# Patient Record
Sex: MALE | Race: WHITE | NOT HISPANIC OR LATINO | Employment: FULL TIME | URBAN - METROPOLITAN AREA
[De-identification: names, ages, dates, MRNs, and addresses within clinical notes are randomized per-mention and may not be internally consistent; named-entity substitution may affect disease eponyms.]

---

## 2017-08-16 PROCEDURE — 86696 HERPES SIMPLEX TYPE 2 TEST: CPT | Performed by: NURSE PRACTITIONER

## 2017-08-16 PROCEDURE — G0432 EIA HIV-1/HIV-2 SCREEN: HCPCS | Performed by: NURSE PRACTITIONER

## 2017-08-16 PROCEDURE — 87661 TRICHOMONAS VAGINALIS AMPLIF: CPT | Performed by: NURSE PRACTITIONER

## 2017-08-16 PROCEDURE — 87491 CHLMYD TRACH DNA AMP PROBE: CPT | Performed by: NURSE PRACTITIONER

## 2017-08-16 PROCEDURE — 86695 HERPES SIMPLEX TYPE 1 TEST: CPT | Performed by: NURSE PRACTITIONER

## 2017-08-16 PROCEDURE — 80074 ACUTE HEPATITIS PANEL: CPT | Performed by: NURSE PRACTITIONER

## 2017-08-16 PROCEDURE — 87591 N.GONORRHOEAE DNA AMP PROB: CPT | Performed by: NURSE PRACTITIONER

## 2017-08-19 ENCOUNTER — TELEPHONE (OUTPATIENT)
Dept: URGENT CARE | Facility: CLINIC | Age: 22
End: 2017-08-19

## 2020-12-30 ENCOUNTER — HOSPITAL ENCOUNTER (EMERGENCY)
Facility: HOSPITAL | Age: 25
Discharge: HOME | End: 2020-12-31
Attending: EMERGENCY MEDICINE | Admitting: EMERGENCY MEDICINE

## 2020-12-30 ENCOUNTER — APPOINTMENT (OUTPATIENT)
Dept: GENERAL RADIOLOGY | Facility: HOSPITAL | Age: 25
End: 2020-12-30

## 2020-12-30 ENCOUNTER — APPOINTMENT (OUTPATIENT)
Dept: CT IMAGING | Facility: HOSPITAL | Age: 25
End: 2020-12-30

## 2020-12-30 DIAGNOSIS — R06.00 DYSPNEA, UNSPECIFIED TYPE: ICD-10-CM

## 2020-12-30 DIAGNOSIS — R07.9 CHEST PAIN, UNSPECIFIED TYPE: Primary | ICD-10-CM

## 2020-12-30 DIAGNOSIS — B34.9 ACUTE VIRAL SYNDROME: ICD-10-CM

## 2020-12-30 LAB
ALBUMIN SERPL-MCNC: 4.6 G/DL (ref 3.5–5.2)
ALBUMIN/GLOB SERPL: 1.8 G/DL
ALP SERPL-CCNC: 81 U/L (ref 39–117)
ALT SERPL W P-5'-P-CCNC: 44 U/L (ref 1–41)
ANION GAP SERPL CALCULATED.3IONS-SCNC: 16 MMOL/L (ref 5–15)
AST SERPL-CCNC: 60 U/L (ref 1–40)
BASOPHILS # BLD AUTO: 0.04 10*3/MM3 (ref 0–0.2)
BASOPHILS NFR BLD AUTO: 0.5 % (ref 0–1.5)
BILIRUB SERPL-MCNC: 1 MG/DL (ref 0–1.2)
BUN SERPL-MCNC: 9 MG/DL (ref 6–20)
BUN/CREAT SERPL: 12 (ref 7–25)
CALCIUM SPEC-SCNC: 9.7 MG/DL (ref 8.6–10.5)
CHLORIDE SERPL-SCNC: 99 MMOL/L (ref 98–107)
CO2 SERPL-SCNC: 22 MMOL/L (ref 22–29)
CREAT SERPL-MCNC: 0.75 MG/DL (ref 0.76–1.27)
DEPRECATED RDW RBC AUTO: 39.4 FL (ref 37–54)
EOSINOPHIL # BLD AUTO: 0.05 10*3/MM3 (ref 0–0.4)
EOSINOPHIL NFR BLD AUTO: 0.6 % (ref 0.3–6.2)
ERYTHROCYTE [DISTWIDTH] IN BLOOD BY AUTOMATED COUNT: 11.9 % (ref 12.3–15.4)
GFR SERPL CREATININE-BSD FRML MDRD: 127 ML/MIN/1.73
GLOBULIN UR ELPH-MCNC: 2.6 GM/DL
GLUCOSE SERPL-MCNC: 117 MG/DL (ref 65–99)
HCT VFR BLD AUTO: 47.7 % (ref 37.5–51)
HGB BLD-MCNC: 16.5 G/DL (ref 13–17.7)
HOLD SPECIMEN: NORMAL
HOLD SPECIMEN: NORMAL
IMM GRANULOCYTES # BLD AUTO: 0.02 10*3/MM3 (ref 0–0.05)
IMM GRANULOCYTES NFR BLD AUTO: 0.3 % (ref 0–0.5)
LYMPHOCYTES # BLD AUTO: 2.26 10*3/MM3 (ref 0.7–3.1)
LYMPHOCYTES NFR BLD AUTO: 28.6 % (ref 19.6–45.3)
MCH RBC QN AUTO: 31.1 PG (ref 26.6–33)
MCHC RBC AUTO-ENTMCNC: 34.6 G/DL (ref 31.5–35.7)
MCV RBC AUTO: 90 FL (ref 79–97)
MONOCYTES # BLD AUTO: 0.75 10*3/MM3 (ref 0.1–0.9)
MONOCYTES NFR BLD AUTO: 9.5 % (ref 5–12)
NEUTROPHILS NFR BLD AUTO: 4.78 10*3/MM3 (ref 1.7–7)
NEUTROPHILS NFR BLD AUTO: 60.5 % (ref 42.7–76)
NRBC BLD AUTO-RTO: 0 /100 WBC (ref 0–0.2)
PLATELET # BLD AUTO: 212 10*3/MM3 (ref 140–450)
PMV BLD AUTO: 11.8 FL (ref 6–12)
POTASSIUM SERPL-SCNC: 3.4 MMOL/L (ref 3.5–5.2)
PROT SERPL-MCNC: 7.2 G/DL (ref 6–8.5)
RBC # BLD AUTO: 5.3 10*6/MM3 (ref 4.14–5.8)
SODIUM SERPL-SCNC: 137 MMOL/L (ref 136–145)
TROPONIN T SERPL-MCNC: <0.01 NG/ML (ref 0–0.03)
WBC # BLD AUTO: 7.9 10*3/MM3 (ref 3.4–10.8)
WHOLE BLOOD HOLD SPECIMEN: NORMAL
WHOLE BLOOD HOLD SPECIMEN: NORMAL

## 2020-12-30 PROCEDURE — 0 IOPAMIDOL PER 1 ML: Performed by: EMERGENCY MEDICINE

## 2020-12-30 PROCEDURE — 84484 ASSAY OF TROPONIN QUANT: CPT

## 2020-12-30 PROCEDURE — 99283 EMERGENCY DEPT VISIT LOW MDM: CPT

## 2020-12-30 PROCEDURE — 93005 ELECTROCARDIOGRAM TRACING: CPT

## 2020-12-30 PROCEDURE — 93005 ELECTROCARDIOGRAM TRACING: CPT | Performed by: EMERGENCY MEDICINE

## 2020-12-30 PROCEDURE — 85025 COMPLETE CBC W/AUTO DIFF WBC: CPT

## 2020-12-30 PROCEDURE — 71045 X-RAY EXAM CHEST 1 VIEW: CPT

## 2020-12-30 PROCEDURE — 80053 COMPREHEN METABOLIC PANEL: CPT

## 2020-12-30 PROCEDURE — 71275 CT ANGIOGRAPHY CHEST: CPT

## 2020-12-30 RX ORDER — SODIUM CHLORIDE 0.9 % (FLUSH) 0.9 %
10 SYRINGE (ML) INJECTION AS NEEDED
Status: DISCONTINUED | OUTPATIENT
Start: 2020-12-30 | End: 2020-12-31 | Stop reason: HOSPADM

## 2020-12-30 RX ADMIN — IOPAMIDOL 70 ML: 755 INJECTION, SOLUTION INTRAVENOUS at 23:49

## 2020-12-31 VITALS
WEIGHT: 210 LBS | SYSTOLIC BLOOD PRESSURE: 137 MMHG | OXYGEN SATURATION: 94 % | HEIGHT: 76 IN | BODY MASS INDEX: 25.57 KG/M2 | DIASTOLIC BLOOD PRESSURE: 99 MMHG | HEART RATE: 98 BPM | TEMPERATURE: 97.4 F | RESPIRATION RATE: 18 BRPM

## 2020-12-31 LAB
QT INTERVAL: 334 MS
QTC INTERVAL: 439 MS
SARS-COV-2 RNA RESP QL NAA+PROBE: NOT DETECTED
TROPONIN T SERPL-MCNC: <0.01 NG/ML (ref 0–0.03)

## 2020-12-31 PROCEDURE — C9803 HOPD COVID-19 SPEC COLLECT: HCPCS | Performed by: EMERGENCY MEDICINE

## 2020-12-31 PROCEDURE — U0004 COV-19 TEST NON-CDC HGH THRU: HCPCS | Performed by: EMERGENCY MEDICINE

## 2020-12-31 PROCEDURE — 93005 ELECTROCARDIOGRAM TRACING: CPT | Performed by: EMERGENCY MEDICINE

## 2020-12-31 PROCEDURE — 84484 ASSAY OF TROPONIN QUANT: CPT | Performed by: EMERGENCY MEDICINE

## 2021-01-12 NOTE — PROGRESS NOTES
Chief Complaint  Establish Care and Chest Pain    Subjective    History of Present Illness {CC  Problem List  Visit  Diagnosis   Encounters  Notes  Medications  Labs  Result Review Imaging  Media :23}     Pilo Dobson presents to [facility] for chest pain  History of Present Illness   25-year-old male who presents today for evaluation of chest pain at the request of Dr. Palacios.  Patient presented to the ED on 12/31 with acute onset of chest pain shortness of breath.  He reports he was standing up for some dinner when he had onset of shortness of breath, lightheadedness, fatigue and chest pain. He says it felt like someone punched him in the chest.  He denies loss of consciousness but felt near syncopal.  Denies associated palpitations. He also reports associated tingling sensation rating from his left midforearm to his left hand.   CT of the chest negative.  Other work-up in the ED was unremarkable. He has had no further chest pain. He walks daily without any exertional symptoms. Occasional feeling of heart racing about once or twice a week. Denies shortness of breath. He notes a history of heartburn, but only with certain foods. He drinks very little caffeine. Drinks a lot of water. No excessive alcohol intake.   Smokes a couple of cigarettes a week if he is drinking  He reports that he saw a cardiologist when he was a child because of an abnormal EKG and wore a heart monitor. Played sports without any issues. No hx of syncope  Reports history of anxiety but denies increased stress    Cardiac risks: gender, light tobacco use    Objective     Vital Signs:   Vitals:    01/13/21 1349 01/13/21 1350 01/13/21 1351   BP: 139/85 134/85 138/84   BP Location: Right arm Left arm Left arm   Patient Position: Sitting Standing Sitting   Cuff Size: Adult Adult Adult   Pulse: 106 115 106   Resp:   18   Temp:   98.8 °F (37.1 °C)   TempSrc:   Temporal   SpO2: 98% 98% 98%   Weight:    "103 kg (226 lb 8 oz)   Height:   193 cm (76\")     Body mass index is 27.57 kg/m².  Physical Exam  Vitals signs reviewed.   Constitutional:       Appearance: Normal appearance.   HENT:      Head: Normocephalic.   Eyes:      Extraocular Movements: Extraocular movements intact.      Pupils: Pupils are equal, round, and reactive to light.   Neck:      Musculoskeletal: Neck supple.      Vascular: No carotid bruit.   Cardiovascular:      Rate and Rhythm: Regular rhythm. Tachycardia present.      Pulses: Normal pulses.      Heart sounds: Normal heart sounds. No murmur.   Pulmonary:      Effort: Pulmonary effort is normal. No respiratory distress.      Breath sounds: Normal breath sounds.   Chest:      Chest wall: No tenderness.   Abdominal:      General: Abdomen is flat.      Palpations: Abdomen is soft.   Musculoskeletal:      Right lower leg: No edema.      Left lower leg: No edema.   Skin:     General: Skin is warm and dry.   Neurological:      General: No focal deficit present.      Mental Status: He is alert and oriented to person, place, and time. Mental status is at baseline.   Psychiatric:         Mood and Affect: Mood normal.         Behavior: Behavior normal.         Thought Content: Thought content normal.              Result Review  Data Reviewed:{ Labs  Result Review  Imaging  Med Tab  Media :23}   COVID PRE-OP / PRE-PROCEDURE SCREENING ORDER (NO ISOLATION) - Swab, Nasopharynx (12/31/2020 03:03)  Troponin (12/31/2020 01:47)  Troponin (12/30/2020 20:34)  Comprehensive Metabolic Panel (12/30/2020 20:34)  CBC & Differential (12/30/2020 20:34)  XR Chest 1 View (12/30/2020 21:29)  CT Angiogram Chest (12/30/2020 23:54)  ECG 12 Lead (12/31/2020 01:40)    Consultant notes 12/31           Assessment and Plan {CC Problem List  Visit Diagnosis  ROS  Review (Popup)  Health Maintenance  Quality  BestPractice  Medications  SmartSets  SnapShot Encounters  Media :23}   1. Chest pain, atypical  1 episode of " "chest pain in a low risk 25-year-old.  Unlikely ischemic.  He did have some associated near syncopal symptoms and considering his elevated heart rate on today's exam I would be more concerned about arrhythmias therefore will do an extended Holter monitor.  We will also do an echo to rule out any structural heart problems.  - Adult Transthoracic Echo Complete W/ Cont if Necessary Per Protocol; Future  - Lipid Panel; Future  - Thyroid Panel With TSH; Future  - Basic Metabolic Panel; Future    2. Tachycardia  He reports history of anxiety and also that he does not like to come to the doctors.  Could be \"whitecoat\". Will evaluate with a monitor  - Holter Monitor - 72 Hour Up To 21 Days; Future  - Adult Transthoracic Echo Complete W/ Cont if Necessary Per Protocol; Future  - Thyroid Panel With TSH; Future  - Basic Metabolic Panel; Future  - Magnesium; Future    3. Lightheadedness  - Holter Monitor - 72 Hour Up To 21 Days; Future  - Adult Transthoracic Echo Complete W/ Cont if Necessary Per Protocol; Future  - Thyroid Panel With TSH; Future  - Basic Metabolic Panel; Future  - Magnesium; Future    4. Hypokalemia  - Basic Metabolic Panel; Future  - Magnesium; Future    5. 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  Advised him to make sure that he limits his alcohol preferably avoids due to elevated liver enzymes.  I have strongly advised him to get a primary care provider to also follow-up on this.  He defers referral today and says he will make an appointment on his own    6. Elevated blood-pressure reading, without diagnosis of hypertension  ? \"white coat\".  I advised him to start monitoring outside the office.  Discussed goal blood pressure of 120/80.  Discussed risks of uncontrolled blood pressure.  I encouraged him to establish care " with a PCP        Follow Up {Instructions Charge Capture  Follow-up Communications :23}   Return in about 5 weeks (around 2/17/2021) for Telemedicine, Monitor results.    Patient was given instructions and counseling regarding his condition or for health maintenance advice. Please see specific information pulled into the AVS if appropriate.  Patient was instructed to call the [facility] with any questions, concerns, or worsening symptoms.    *Please note that portions of this note were completed with a voice recognition program. Efforts were made to edit the dictations, but occasionally words are mistranscribed.

## 2021-01-13 ENCOUNTER — LAB (OUTPATIENT)
Dept: LAB | Facility: HOSPITAL | Age: 26
End: 2021-01-13

## 2021-01-13 ENCOUNTER — HOSPITAL ENCOUNTER (OUTPATIENT)
Dept: CARDIOLOGY | Facility: HOSPITAL | Age: 26
Discharge: HOME | End: 2021-01-13

## 2021-01-13 ENCOUNTER — OFFICE VISIT (OUTPATIENT)
Dept: CARDIOLOGY | Facility: HOSPITAL | Age: 26
End: 2021-01-13

## 2021-01-13 VITALS
OXYGEN SATURATION: 98 % | RESPIRATION RATE: 18 BRPM | WEIGHT: 226.5 LBS | HEART RATE: 106 BPM | HEIGHT: 76 IN | SYSTOLIC BLOOD PRESSURE: 138 MMHG | TEMPERATURE: 98.8 F | BODY MASS INDEX: 27.58 KG/M2 | DIASTOLIC BLOOD PRESSURE: 84 MMHG

## 2021-01-13 DIAGNOSIS — R07.89 CHEST PAIN, ATYPICAL: ICD-10-CM

## 2021-01-13 DIAGNOSIS — R03.0 ELEVATED BLOOD-PRESSURE READING, WITHOUT DIAGNOSIS OF HYPERTENSION: ICD-10-CM

## 2021-01-13 DIAGNOSIS — R42 LIGHTHEADEDNESS: ICD-10-CM

## 2021-01-13 DIAGNOSIS — R74.8 ELEVATED LIVER ENZYMES: ICD-10-CM

## 2021-01-13 DIAGNOSIS — E87.6 HYPOKALEMIA: ICD-10-CM

## 2021-01-13 DIAGNOSIS — R00.0 TACHYCARDIA: ICD-10-CM

## 2021-01-13 DIAGNOSIS — R07.89 CHEST PAIN, ATYPICAL: Primary | ICD-10-CM

## 2021-01-13 LAB
ANION GAP SERPL CALCULATED.3IONS-SCNC: 12.9 MMOL/L (ref 5–15)
BUN SERPL-MCNC: 7 MG/DL (ref 6–20)
BUN/CREAT SERPL: 9.2 (ref 7–25)
CALCIUM SPEC-SCNC: 9.9 MG/DL (ref 8.6–10.5)
CHLORIDE SERPL-SCNC: 98 MMOL/L (ref 98–107)
CHOLEST SERPL-MCNC: 352 MG/DL (ref 0–200)
CO2 SERPL-SCNC: 26.1 MMOL/L (ref 22–29)
CREAT SERPL-MCNC: 0.76 MG/DL (ref 0.76–1.27)
GFR SERPL CREATININE-BSD FRML MDRD: 125 ML/MIN/1.73
GLUCOSE SERPL-MCNC: 87 MG/DL (ref 65–99)
HDLC SERPL-MCNC: 52 MG/DL (ref 40–60)
LDLC SERPL CALC-MCNC: 195 MG/DL (ref 0–100)
LDLC/HDLC SERPL: 3.84 {RATIO}
MAGNESIUM SERPL-MCNC: 2.1 MG/DL (ref 1.6–2.6)
POTASSIUM SERPL-SCNC: 4.2 MMOL/L (ref 3.5–5.2)
SODIUM SERPL-SCNC: 137 MMOL/L (ref 136–145)
T-UPTAKE NFR SERPL: 0.97 TBI (ref 0.8–1.3)
T4 SERPL-MCNC: 6.86 MCG/DL (ref 4.5–11.7)
TRIGL SERPL-MCNC: 501 MG/DL (ref 0–150)
TSH SERPL DL<=0.05 MIU/L-ACNC: 0.95 UIU/ML (ref 0.27–4.2)
VLDLC SERPL-MCNC: 105 MG/DL (ref 5–40)

## 2021-01-13 PROCEDURE — 36415 COLL VENOUS BLD VENIPUNCTURE: CPT

## 2021-01-13 PROCEDURE — 83735 ASSAY OF MAGNESIUM: CPT

## 2021-01-13 PROCEDURE — 84443 ASSAY THYROID STIM HORMONE: CPT

## 2021-01-13 PROCEDURE — 80048 BASIC METABOLIC PNL TOTAL CA: CPT

## 2021-01-13 PROCEDURE — 80061 LIPID PANEL: CPT

## 2021-01-13 PROCEDURE — 84436 ASSAY OF TOTAL THYROXINE: CPT

## 2021-01-13 PROCEDURE — 93246 EXT ECG>7D<15D RECORDING: CPT

## 2021-01-13 PROCEDURE — 84479 ASSAY OF THYROID (T3 OR T4): CPT

## 2021-01-13 PROCEDURE — 99204 OFFICE O/P NEW MOD 45 MIN: CPT | Performed by: NURSE PRACTITIONER

## 2021-01-13 RX ORDER — ACETAMINOPHEN 500 MG
500 TABLET ORAL EVERY 6 HOURS PRN
COMMUNITY
End: 2022-05-19

## 2021-01-14 ENCOUNTER — TELEPHONE (OUTPATIENT)
Dept: CARDIOLOGY | Facility: HOSPITAL | Age: 26
End: 2021-01-14

## 2021-01-14 NOTE — TELEPHONE ENCOUNTER
Results for orders placed or performed in visit on 01/13/21   Lipid Panel    Specimen: Blood   Result Value Ref Range    Total Cholesterol 352 (H) 0 - 200 mg/dL    Triglycerides 501 (H) 0 - 150 mg/dL    HDL Cholesterol 52 40 - 60 mg/dL    LDL Cholesterol  195 (H) 0 - 100 mg/dL    VLDL Cholesterol 105 (H) 5 - 40 mg/dL    LDL/HDL Ratio 3.84    Thyroid Panel With TSH    Specimen: Blood   Result Value Ref Range    TSH 0.952 0.270 - 4.200 uIU/mL    T Uptake 0.97 0.80 - 1.30 TBI    T4, Total 6.86 4.50 - 11.70 mcg/dL   Basic Metabolic Panel    Specimen: Blood   Result Value Ref Range    Glucose 87 65 - 99 mg/dL    BUN 7 6 - 20 mg/dL    Creatinine 0.76 0.76 - 1.27 mg/dL    Sodium 137 136 - 145 mmol/L    Potassium 4.2 3.5 - 5.2 mmol/L    Chloride 98 98 - 107 mmol/L    CO2 26.1 22.0 - 29.0 mmol/L    Calcium 9.9 8.6 - 10.5 mg/dL    eGFR Non African Amer 125 >60 mL/min/1.73    BUN/Creatinine Ratio 9.2 7.0 - 25.0    Anion Gap 12.9 5.0 - 15.0 mmol/L   Magnesium    Specimen: Blood   Result Value Ref Range    Magnesium 2.1 1.6 - 2.6 mg/dL     Called patient with lab results. Cholesterol significantly elevated.  Patient was not fasting.  He admits to a rather poor diet.  He eats out at least 4 times a week, usually hamburgers.  He says he eats a lot of sandwiches and eggs.  We have a long discussion over the phone regarding improving his diet.  Recommend Mediterranean diet that focuses mainly on vegetables and lean proteins. Also recommend 4g of fish oil a day. Patient was also not fasting.  Due to elevated liver enzymes, we both have decided to not start him on a statin.  He is going to work hard to improve his diet and we are to repeat his liver enzymes and repeat a fasting lipid panel in 6 weeks.  He verbalized understanding with no further questions or concerns

## 2021-02-04 PROCEDURE — 93248 EXT ECG>7D<15D REV&INTERPJ: CPT | Performed by: INTERNAL MEDICINE

## 2021-02-16 ENCOUNTER — APPOINTMENT (OUTPATIENT)
Dept: CARDIOLOGY | Facility: HOSPITAL | Age: 26
End: 2021-02-16

## 2021-02-16 ENCOUNTER — DOCUMENTATION (OUTPATIENT)
Dept: CARDIOLOGY | Facility: HOSPITAL | Age: 26
End: 2021-02-16

## 2021-02-16 NOTE — PROGRESS NOTES
Attempted to call patient multiple times for to do a telehealth visit today or to reschedule to an office visit. Left 2 messages for him to return call

## 2022-05-21 ENCOUNTER — OFFICE VISIT (OUTPATIENT)
Dept: FAMILY MEDICINE CLINIC | Facility: CLINIC | Age: 27
End: 2022-05-21

## 2022-05-21 VITALS
DIASTOLIC BLOOD PRESSURE: 74 MMHG | HEART RATE: 108 BPM | OXYGEN SATURATION: 98 % | TEMPERATURE: 98.7 F | SYSTOLIC BLOOD PRESSURE: 120 MMHG | BODY MASS INDEX: 25.94 KG/M2 | WEIGHT: 213 LBS | HEIGHT: 76 IN

## 2022-05-21 DIAGNOSIS — F41.1 GAD (GENERALIZED ANXIETY DISORDER): Primary | ICD-10-CM

## 2022-05-21 PROCEDURE — 99213 OFFICE O/P EST LOW 20 MIN: CPT | Performed by: NURSE PRACTITIONER

## 2022-05-21 RX ORDER — ESCITALOPRAM OXALATE 10 MG/1
10 TABLET ORAL DAILY
Qty: 30 TABLET | Refills: 0 | Status: SHIPPED | OUTPATIENT
Start: 2022-05-21 | End: 2022-07-07

## 2022-05-21 NOTE — PROGRESS NOTES
"Chief Complaint  Follow-up (05/19/22) and Abdominal Pain    Subjective          Pilo Dobson presents to [facility]  Pt is here today to establish care and for a follow up from  2 days ago. He was seen there for dizziness, light-headedness, weakness, and right upper quadrant pain. He has a history of high blood pressure and a family history of cardiac issues. He states he had an abnormal EKG as a child, and in 2020 he had an abnormal EKG again. He saw a Cardiologist after that. He states he did not go back to Cardiology because he did not have insurance at the time. He wants to wait on Cardiology referral today so that he can talk to family members about what doctor he wants to see. Pt states some of his abdominal symptoms started one week again. Last Saturday he woke up in a cold sweat. Symptoms got worse on Wednesday. Pt states his diet has changed in the last few months, and his symptoms have gotten worse since. He was following a strict diet due to elevated lipid panel. HR is elevated today, and he states that is his normal rate.    Pt would like to discuss anxiety medication. He states he has always had anxiety, but has never taken medication. He states he has a history of panic attacks, but lately they have gotten worse, and he feels like it is disrupting his daily life.     Abdominal Pain  This is a new problem. The current episode started in the past 7 days. The problem has been resolved. The pain is located in the RUQ. The quality of the pain is dull. The abdominal pain radiates to the back. Associated symptoms include belching, diarrhea and nausea. Pertinent negatives include no constipation, fever or vomiting. Nothing aggravates the pain. The pain is relieved by sitting up and liquids.     Objective   Vital Signs:  /74 (BP Location: Left arm, Patient Position: Sitting, Cuff Size: Large Adult)   Pulse 108   Temp 98.7 °F (37.1 °C)   Ht 193 cm (75.98\")   Wt 96.6 " kg (213 lb)   SpO2 98%   BMI 25.94 kg/m²       Physical Exam  Vitals reviewed.   Constitutional:       Appearance: Normal appearance.   HENT:      Nose: Nose normal.      Mouth/Throat:      Mouth: Mucous membranes are moist.   Eyes:      Conjunctiva/sclera: Conjunctivae normal.   Cardiovascular:      Rate and Rhythm: Regular rhythm. Tachycardia present.      Heart sounds: Normal heart sounds.   Pulmonary:      Effort: Pulmonary effort is normal.      Breath sounds: Normal breath sounds.   Abdominal:      General: Bowel sounds are normal.      Palpations: Abdomen is soft.   Musculoskeletal:         General: Normal range of motion.   Skin:     General: Skin is warm.   Neurological:      Mental Status: He is alert and oriented to person, place, and time.   Psychiatric:         Mood and Affect: Mood normal.         Behavior: Behavior normal.        Result Review :                 Assessment and Plan    Diagnoses and all orders for this visit:    1. MOOSE (generalized anxiety disorder) (Primary)  -     Ambulatory Referral to Behavioral Health  -     escitalopram (Lexapro) 10 MG tablet; Take 1 tablet by mouth Daily for 30 days. Take 1/2 daily for one week. Then up dose to 1 tablet daily.  Dispense: 30 tablet; Refill: 0             Follow Up   Return in about 1 month (around 6/21/2022) for Annual.  Patient was given instructions and counseling regarding his condition or for health maintenance advice. Please see specific information pulled into the AVS if appropriate.

## 2022-07-07 ENCOUNTER — OFFICE VISIT (OUTPATIENT)
Dept: FAMILY MEDICINE CLINIC | Facility: CLINIC | Age: 27
End: 2022-07-07

## 2022-07-07 VITALS
WEIGHT: 211 LBS | TEMPERATURE: 98.2 F | HEIGHT: 75 IN | HEART RATE: 103 BPM | BODY MASS INDEX: 26.24 KG/M2 | OXYGEN SATURATION: 97 % | DIASTOLIC BLOOD PRESSURE: 80 MMHG | SYSTOLIC BLOOD PRESSURE: 122 MMHG

## 2022-07-07 DIAGNOSIS — Z00.00 ANNUAL PHYSICAL EXAM: Primary | ICD-10-CM

## 2022-07-07 DIAGNOSIS — Z72.0 TOBACCO USE: ICD-10-CM

## 2022-07-07 DIAGNOSIS — F41.9 ANXIETY: ICD-10-CM

## 2022-07-07 DIAGNOSIS — R53.83 OTHER FATIGUE: ICD-10-CM

## 2022-07-07 PROCEDURE — 99214 OFFICE O/P EST MOD 30 MIN: CPT | Performed by: NURSE PRACTITIONER

## 2022-07-07 PROCEDURE — 99395 PREV VISIT EST AGE 18-39: CPT | Performed by: NURSE PRACTITIONER

## 2022-07-07 RX ORDER — SERTRALINE HYDROCHLORIDE 25 MG/1
25 TABLET, FILM COATED ORAL DAILY
Qty: 30 TABLET | Refills: 1 | Status: SHIPPED | OUTPATIENT
Start: 2022-07-07 | End: 2022-07-26 | Stop reason: DRUGHIGH

## 2022-07-07 NOTE — PROGRESS NOTES
"Chief Complaint  Annual Exam and Anxiety (Pt states he started Lexapro at his last visit and it has made him kind of jittery and nervous. )    Subjective          Pilo Dobson presents to [facility] for   Pt is here today for his annual exam. He does not have any concerns at this time, other than anxiety symptoms. He states he does have seasonal allergies. He takes loratadine daily to manage. He reports good results with this.     At last visit, pt was started on Lexapro. He states he feels like it has made him jittery. He had increased to 10 Mg dose, but has been taking 5 Mg for last 4 days. Pt was not aware at last visit that he had taken other anxiety/depression meds in the past. After talking with his mother, she sent a long list of medications that they tried with him in his younger life. Most of the medications produced side effects. Pt will gradually stop Lexapro, and start Zoloft. This seems to be one that has worked for him in the past. He will also follow up with behavioral health for med management and counseling services. He does not feel like he can wait for Behavioral Health appt to try another medication due to level of anxiety.    Pt is a current every day smoker. He states he smokes 3-4 cigarettes/day. Discussed smoking cessation. Also discussed how nicotine can caused increased anxiety. Smoking cessation information added to AVS.     Pt is experiencing fatigue. He states he has difficulty getting up in the morning. This may be related to medication, although he is on a small dose.     Objective   Vital Signs:   Vitals:    07/07/22 0826   BP: 122/80   Pulse: 103   Temp: 98.2 °F (36.8 °C)   SpO2: 97%   Weight: 95.7 kg (211 lb)   Height: 190.5 cm (75\")   PainSc: 0-No pain     Body mass index is 26.37 kg/m².    BMI is >= 25 and <30. (Overweight) The following options were offered after discussion;: none (medical contraindication)    Physical Exam  Vitals reviewed. "   Constitutional:       Appearance: Normal appearance.   HENT:      Right Ear: Tympanic membrane, ear canal and external ear normal.      Left Ear: Tympanic membrane, ear canal and external ear normal.      Nose: Nose normal.      Mouth/Throat:      Mouth: Mucous membranes are moist.   Eyes:      Extraocular Movements: Extraocular movements intact.      Conjunctiva/sclera: Conjunctivae normal.      Pupils: Pupils are equal, round, and reactive to light.   Cardiovascular:      Rate and Rhythm: Normal rate and regular rhythm.      Pulses: Normal pulses.      Heart sounds: Normal heart sounds.   Pulmonary:      Effort: Pulmonary effort is normal.      Breath sounds: Normal breath sounds.   Abdominal:      General: Abdomen is flat. Bowel sounds are normal.      Palpations: Abdomen is soft.   Musculoskeletal:         General: Normal range of motion.      Cervical back: Normal range of motion and neck supple.   Skin:     General: Skin is warm.   Neurological:      Mental Status: He is alert and oriented to person, place, and time.   Psychiatric:         Mood and Affect: Mood normal.         Behavior: Behavior normal.         Thought Content: Thought content normal.        Result Review :              Immunization History   Administered Date(s) Administered   • COVID-19 (MODERNA) 1st, 2nd, 3rd Dose Only 08/21/2021, 09/18/2021   • Influenza, Unspecified 10/09/2018   • Tdap 07/31/2015     Health Maintenance   Topic Date Due   • INFLUENZA VACCINE  10/01/2022   • TDAP/TD VACCINES (2 - Td or Tdap) 07/31/2025        Assessment and Plan    Diagnoses and all orders for this visit:    1. Annual physical exam (Primary)    2. Tobacco use    3. Other fatigue  -     Vitamin D 25 Hydroxy; Future  -     CBC Auto Differential; Future    4. Anxiety  -     sertraline (Zoloft) 25 MG tablet; Take 1 tablet by mouth Daily. Take 1/2 tablet by mouth daily for 1 week, then increase to 1 tablet by mouth daily.  Dispense: 30 tablet; Refill: 1  -      Lipid Panel; Future        Counseled on health maintenance topics and preventative care recommendations:     Counseled on immunizations.   Counseled on smoking cessation and risks of smoking.       Follow Up   No follow-ups on file.  Patient was given instructions and counseling regarding his condition or for health maintenance advice. Please see specific information pulled into the AVS if appropriate.

## 2022-07-26 ENCOUNTER — TELEMEDICINE (OUTPATIENT)
Dept: PSYCHIATRY | Facility: CLINIC | Age: 27
End: 2022-07-26

## 2022-07-26 DIAGNOSIS — F41.1 GAD (GENERALIZED ANXIETY DISORDER): Primary | ICD-10-CM

## 2022-07-26 PROCEDURE — 90792 PSYCH DIAG EVAL W/MED SRVCS: CPT | Performed by: NURSE PRACTITIONER

## 2022-07-26 RX ORDER — CLONAZEPAM 0.5 MG/1
0.5 TABLET ORAL 2 TIMES DAILY PRN
Qty: 15 TABLET | Refills: 0 | Status: SHIPPED | OUTPATIENT
Start: 2022-07-26 | End: 2022-08-17 | Stop reason: SDUPTHER

## 2022-07-26 NOTE — PROGRESS NOTES
Patient Name: Pilo Dobson  MRN: [id number]   :  1995     Referring Physician: Sherry Bellamy APRN    This provider is located at the [facility] (through [facility]), [address] [facility] [address], [facility] using a secure MyChart Video Visit through [employer]. Patient is being seen remotely via telehealth at their home address in [place], and stated they are in a secure environment for this session. The patient's condition being diagnosed/treated is appropriate for telemedicine. The provider identified herself as well as her credentials.   The patient, and/or patients guardian, consent to be seen remotely, and when consent is given they understand that the consent allows for patient identifiable information to be sent to a third party as needed.   They may refuse to be seen remotely at any time. The electronic data is encrypted and password protected, and the patient and/or guardian has been advised of the potential risks to privacy not withstanding such measures.    You have chosen to receive care through a telehealth visit.  Do you consent to use a video/audio connection for your medical care today? Yes    Chief Complaint:     ICD-10-CM ICD-9-CM   1. MOOSE (generalized anxiety disorder)  F41.1 300.02       HPI:   Pilo Dobson is a 27 y.o. male who is here today for initial evaluation of Anxiety .,  Patient states he has been struggling with anxiety and depression.  Patient was initially placed on Lexapro which made his anxiety much worse.  Patient was then placed on Zoloft 25 mg daily.  Patient states this helped his depression however only decreased anxiety minimally.  States it still not at a manageable level.  Patient states he has been struggling with this for years.  Patient is in sales at [employer].  Patient is in a long-term relationship.  His girlfriend has a daughter that 6 years old.  Patient states things got pretty intense when he came home on Friday  having a panic attack that kept him in bed all weekend.  Girlfriend felt at that time it was important for patient to get help.  Patient states his mom and brother are both on Zoloft and it seems to work for them.  Patient states his mom is also 1 as needed Klonopin.    Past Medical History:   Past Medical History:   Diagnosis Date   • Anxiety    • Depression    • Headache    • Hypertension    • Urinary tract infection        Past Surgical History:   Past Surgical History:   Procedure Laterality Date   • DENTAL PROCEDURE     • TONSILLECTOMY         Social History:   Social History     Socioeconomic History   • Marital status: Single   Tobacco Use   • Smoking status: Light Tobacco Smoker     Packs/day: 0.25     Years: 0.50     Pack years: 0.12     Types: Cigarettes     Start date: 1/1/2018   • Smokeless tobacco: Never Used   • Tobacco comment: 4-5 CIGARETTES PER DAY   Vaping Use   • Vaping Use: Never used   Substance and Sexual Activity   • Alcohol use: Yes     Alcohol/week: 16.0 standard drinks     Types: 10 Cans of beer, 6 Shots of liquor per week   • Drug use: Not Currently     Types: Marijuana   • Sexual activity: Yes     Partners: Female       Family History:  Family History   Problem Relation Age of Onset   • Mental illness Mother    • Hypertension Mother    • Obesity Mother    • Obesity Father    • Epilepsy Brother    • Heart valve disorder Maternal Grandmother    • Alcohol abuse Maternal Grandfather    • No Known Problems Paternal Grandmother    • Leukemia Paternal Grandfather    • Thyroid disease Half-Sister        Allergy:  No Known Allergies    Current Medications:   Current Outpatient Medications   Medication Sig Dispense Refill   • clonazePAM (KlonoPIN) 0.5 MG tablet Take 1 tablet by mouth 2 (Two) Times a Day As Needed for Anxiety. 15 tablet 0   • sertraline (Zoloft) 50 MG tablet Take 1 tablet by mouth Daily. 30 tablet 2     No current facility-administered medications for this visit.       Lab Results:    No visits with results within 3 Month(s) from this visit.   Latest known visit with results is:   Lab on 01/13/2021   Component Date Value Ref Range Status   • Total Cholesterol 01/13/2021 352 (A) 0 - 200 mg/dL Final   • Triglycerides 01/13/2021 501 (A) 0 - 150 mg/dL Final   • HDL Cholesterol 01/13/2021 52  40 - 60 mg/dL Final   • LDL Cholesterol  01/13/2021 195 (A) 0 - 100 mg/dL Final   • VLDL Cholesterol 01/13/2021 105 (A) 5 - 40 mg/dL Final   • LDL/HDL Ratio 01/13/2021 3.84   Final   • TSH 01/13/2021 0.952  0.270 - 4.200 uIU/mL Final   • T Uptake 01/13/2021 0.97  0.80 - 1.30 TBI Final   • T4, Total 01/13/2021 6.86  4.50 - 11.70 mcg/dL Final    T4 results may be falsely increased if patient taking Biotin.   • Glucose 01/13/2021 87  65 - 99 mg/dL Final   • BUN 01/13/2021 7  6 - 20 mg/dL Final   • Creatinine 01/13/2021 0.76  0.76 - 1.27 mg/dL Final   • Sodium 01/13/2021 137  136 - 145 mmol/L Final   • Potassium 01/13/2021 4.2  3.5 - 5.2 mmol/L Final   • Chloride 01/13/2021 98  98 - 107 mmol/L Final   • CO2 01/13/2021 26.1  22.0 - 29.0 mmol/L Final   • Calcium 01/13/2021 9.9  8.6 - 10.5 mg/dL Final   • eGFR Non African Amer 01/13/2021 125  >60 mL/min/1.73 Final   • BUN/Creatinine Ratio 01/13/2021 9.2  7.0 - 25.0 Final   • Anion Gap 01/13/2021 12.9  5.0 - 15.0 mmol/L Final   • Magnesium 01/13/2021 2.1  1.6 - 2.6 mg/dL Final       Review of Symptoms:   Review of Systems   Constitutional: Negative for activity change, appetite change, fatigue, unexpected weight gain and unexpected weight loss.   Respiratory: Negative for shortness of breath and wheezing.    Gastrointestinal: Negative for constipation, diarrhea, nausea and vomiting.   Musculoskeletal: Negative for gait problem.   Skin: Negative for dry skin and rash.   Neurological: Negative for dizziness, speech difficulty, weakness, light-headedness, headache, memory problem and confusion.   Psychiatric/Behavioral: Positive for stress. Negative for agitation,  behavioral problems, decreased concentration, dysphoric mood, hallucinations, self-injury, sleep disturbance, suicidal ideas, negative for hyperactivity and depressed mood. The patient is nervous/anxious.        Physical Exam:   Physical Exam  Vitals and nursing note reviewed.   Constitutional:       General: He is not in acute distress.     Appearance: He is well-developed. He is not diaphoretic.   HENT:      Head: Normocephalic and atraumatic.   Eyes:      Conjunctiva/sclera: Conjunctivae normal.   Pulmonary:      Effort: Pulmonary effort is normal. No respiratory distress.   Musculoskeletal:         General: Normal range of motion.      Cervical back: Full passive range of motion without pain and normal range of motion.   Neurological:      Mental Status: He is alert and oriented to person, place, and time.   Psychiatric:         Mood and Affect: Mood is anxious. Mood is not depressed. Affect is not labile, blunt, angry or inappropriate.         Speech: Speech is not rapid and pressured or tangential.         Behavior: Behavior normal. Behavior is not agitated, slowed, aggressive, withdrawn, hyperactive or combative. Behavior is cooperative.         Thought Content: Thought content normal. Thought content is not paranoid or delusional. Thought content does not include homicidal or suicidal ideation. Thought content does not include homicidal or suicidal plan.         Judgment: Judgment normal.       There were no vitals taken for this visit.  There is no height or weight on file to calculate BMI. Video appt unable to obtain.     Mental Status Exam:   Appearance: appropriate  Hygiene:   good  Cooperation:  Cooperative  Eye Contact:  Good  Psychomotor Behavior:  Appropriate  Mood:anxious  Affect:  Appropriate  Hopelessness: Denies  Speech:  Normal  Thought Process:  Goal directed  Thought Content:  Normal  Suicidal:  None  Homicidal:  None  Hallucinations:  None  Delusion:  None  Memory:  Intact  Orientation:   "Person, Place, Time and Situation  Reliability:  good  Insight:  Good  Judgement:  Good  Impulse Control:  Good  Physical/Medical Issues:  No     PHQ-9 Depression Screening  Little interest or pleasure in doing things? (P) 2   Feeling down, depressed, or hopeless? (P) 1   Trouble falling or staying asleep, or sleeping too much? (P) 3   Feeling tired or having little energy? (P) 1   Poor appetite or overeating? (P) 1   Feeling bad about yourself - or that you are a failure or have let yourself or your family down? (P) 1   Trouble concentrating on things, such as reading the newspaper or watching television? (P) 2   Moving or speaking so slowly that other people could have noticed? Or the opposite - being so fidgety or restless that you have been moving around a lot more than usual? (P) 1   Thoughts that you would be better off dead, or of hurting yourself in some way? (P) 0   PHQ-9 Total Score (P) 12   If you checked off any problems, how difficult have these problems made it for you to do your work, take care of things at home, or get along with other people? (P) Somewhat difficult      Controlled Substance Medication Contract    Controlled substance medications (i.e. benzodiazepines, opioids, amphetamines) are very useful, but have a high potential for misuse and are, therefore, closely controlled by local, state, and federal government(s). As a patient of [facility], you agree and understand the followin. I am responsible for the controlled substance medications prescribed to me. If my prescription is misplaced, stolen, or if \"I run out early,\" I understand this medication will not be replaced regardless of the circumstances.  2. Refills of controlled substance medications: (a) Will be made only during regular office hours Monday-Thursday once a month and during a scheduled virtual appointment. Refills will not be made at night, weekends, or on holidays. (b) Will not be made if " "\"I lost my prescriptions,\" \"ran out early,\" or \"misplaced my medication\". I am solely responsible for taking the medication as prescribed and for keeping track of the remaining.   3. I agree to comply with urine drug testing and pill counts at the provider's discretion, thereby, documenting the proper use of any medications. If alcohol abuse is suspected, a breathalyzer or blood alcohol level may be ordered. Unannounced urine or serum toxicology specimens may be requested and my cooperation is required.  4. I understand that if I violate any of the above conditions, my prescriptions for controlled medications my be terminated. If the violation involves obtaining these medications from another individual, or the concomitant use of non-prescription illicit (illegal) drugs, I may also be reported to other providers, pharmacies, medical facilities and the appropriate authorities.   5. I further understand that if I violate this controlled substance contract due to non-compliance of medical directions, such as the failure in taking medications as prescribed, utilizing other illicit drugs, or abuse of controlled medications, the prescription for controlled medications may be terminated.   6. I agree to keep my scheduled appointments and conduct myself in a courteous manner.  7. I agree not to sell, share, or give any medication to another person. I understand that such mishandling of my medication is a serious violation of this agreement and would result in my treatment being terminated without any recourse for appeal.   8. I agree not to take my medication from any physicians, nurse practitioners, pharmacies or other sources without telling my prescriber.  9. I agree to take my medication as my prescriber has instructed and not to alter the way I take my medication without first consulting my prescriber.  10. I agree to abstain from problematic alcohol usage, opioids, marijuana, cocaine, and other addictive substances. "   11. If I am legally involved related to legal or illegal drugs, including alcohol, refill of controlled substances will not be given until a re-evaluation of my chemical dependency treatment plan has been completed. Refills are at the discretion of the prescriber.   12. I agree to fill all of my controlled medications at an in-state ([place]) pharmacy.   13. I understand that [facility] utilizes the [place] All Schedule Prescription Electronic Reporting (JIL) system and will monitor my prescription history via this source.  14. Benzodiazepines are drugs prescribed to treat conditions like anxiety, insomnia, and seizures. Examples of these drugs include: alprazolam, clonazepam, diazepam, and lorazepam. The FDA has applied a Black Box Warning (one of the strictest warnings) that the use of opioids and benzodiazepines together have serious risks to include unusual dizziness or lightheadedness, extreme sleepiness, slowed or difficult breathing, coma and death. There is an added risk if alcohol is also ingested. It is the policy of [facility] to NOT prescribe benzodiazepines to patients who also use opioids. If a patient already presents already prescribed both, the prescriber my direct the patient to their previous provider who prescribed it or taper the benzodiazepine as part of the treatment plan. These patients must be monitored at appropriate intervals and so visits may be more frequent.     This APRN has discussed and reviewed this information with the patient and/or guardian. The patient and/or guardian verbally agreed (no signatures are obtained during today's visit as they are a telehealth patient and is unable to print and sign this document, therefore, verbal agreement has been obtained).     Reviewed [facility] # [id number]    Assessment/Plan:   Diagnoses and all orders for this visit:    1. MOOSE (generalized anxiety disorder) (Primary)  -      sertraline (Zoloft) 50 MG tablet; Take 1 tablet by mouth Daily.  Dispense: 30 tablet; Refill: 2  -     clonazePAM (KlonoPIN) 0.5 MG tablet; Take 1 tablet by mouth 2 (Two) Times a Day As Needed for Anxiety.  Dispense: 15 tablet; Refill: 0  -     Compliance Drug Analysis, Ur - Urine, Clean Catch; Future  -     Ambulatory Referral to Psychiatry    Patient has significant anxiety.  Patient states that Zoloft has helped his depression but not much of the anxiety.  We will increase Zoloft to 50 mg daily.  We will give Klonopin 0.5 mg #15 to last the entire month.  Patient will get urine drug screen.  Patient is aware to use Klonopin very sparingly.    A psychological evaluation was conducted in order to assess past and current level of functioning. Areas assessed included, but were not limited to: perception of social support, perception of ability to face and deal with challenges in life (positive functioning), anxiety symptoms, depressive symptoms, perspective on beliefs/belief system, coping skills for stress, intelligence level,  Therapeutic rapport was established. Interventions conducted today were geared towards incorporating medication management along with support for continued therapy. Education was also provided as to the med management with this provider and what to expect in subsequent sessions.    We discussed risks, benefits,goals and side effects of the above medication and the patient was agreeable with the plan.Patient was educated on the importance of compliance with treatment and follow-up appointments. Patient is aware to contact the [facility] with any worsening of symptoms. To call for questions or concerns and return early if necessary. Patent is agreeable to go to the Emergency Department or call 911 should they begin SI/HI.     Part of this note may be an electronic transcription/translation of spoken language to printed text using the Dragon Dictation System.    Return in about 3 weeks  (around 8/16/2022) for Follow Up 30 min, Video visit.    Kenia Mast, APRN

## 2022-07-27 ENCOUNTER — LAB (OUTPATIENT)
Dept: LAB | Facility: HOSPITAL | Age: 27
End: 2022-07-27

## 2022-08-17 ENCOUNTER — TELEMEDICINE (OUTPATIENT)
Dept: PSYCHIATRY | Facility: CLINIC | Age: 27
End: 2022-08-17

## 2022-08-17 DIAGNOSIS — F41.1 GAD (GENERALIZED ANXIETY DISORDER): Primary | ICD-10-CM

## 2022-08-17 PROCEDURE — 99214 OFFICE O/P EST MOD 30 MIN: CPT | Performed by: NURSE PRACTITIONER

## 2022-08-17 RX ORDER — CLONAZEPAM 0.5 MG/1
0.5 TABLET ORAL 2 TIMES DAILY PRN
Qty: 15 TABLET | Refills: 2 | Status: SHIPPED | OUTPATIENT
Start: 2022-08-17 | End: 2022-12-13 | Stop reason: SDUPTHER

## 2022-08-17 NOTE — PROGRESS NOTES
Patient Name: Pilo Dobson  MRN: [id number]   :  1995     This provider is located at [facility]), [address], [address] using a secure SmartCrowdshart Video Visit through [employer]. Patient is being seen remotely via telehealth at their home address in [place], and stated they are in a secure environment for this session. The patient's condition being diagnosed/treated is appropriate for telemedicine. The provider identified herself as well as her credentials.   The patient, and/or patients guardian, consent to be seen remotely, and when consent is given they understand that the consent allows for patient identifiable information to be sent to a third party as needed.   They may refuse to be seen remotely at any time. The electronic data is encrypted and password protected, and the patient and/or guardian has been advised of the potential risks to privacy not withstanding such measures.    You have chosen to receive care through a telehealth visit.  Do you consent to use a video/audio connection for your medical care today? Yes    Chief Complaint:      ICD-10-CM ICD-9-CM   1. MOOSE (generalized anxiety disorder)  F41.1 300.02       History of Present Illness: Pilo Dobson is a 27 y.o. male is here today for medication management follow up.  Patient states he is doing pretty well.  He is still up to 25 mg of Zoloft as this has helped his depression and his anxiety is more stable.  Tried 50 mg but it started to affect his sleep.  Patient states Klonopin is working well when he needs it.  States he typically takes a half of the pill and this works well.  During vacation or traveling he did take half a pill and once at work he took half a pill.  Started talking to her therapist and is enjoying this as well.    The following portions of the patient's history were reviewed and updated as appropriate: allergies, current medications, past  family history, past medical history, past social history, past surgical history and problem list.    Review of Systems;;  Review of Systems   Constitutional: Negative for activity change, appetite change, fatigue, unexpected weight gain and unexpected weight loss.   Respiratory: Negative for shortness of breath and wheezing.    Gastrointestinal: Negative for constipation, diarrhea, nausea and vomiting.   Musculoskeletal: Negative for gait problem.   Skin: Negative for dry skin and rash.   Neurological: Negative for dizziness, speech difficulty, weakness, light-headedness, headache, memory problem and confusion.   Psychiatric/Behavioral: Negative for agitation, behavioral problems, decreased concentration, dysphoric mood, hallucinations, self-injury, sleep disturbance, suicidal ideas, negative for hyperactivity, depressed mood and stress. The patient is not nervous/anxious.        Physical Exam;;  Physical Exam  Vitals and nursing note reviewed.   Constitutional:       General: He is not in acute distress.     Appearance: He is well-developed. He is not diaphoretic.   HENT:      Head: Normocephalic and atraumatic.   Eyes:      Conjunctiva/sclera: Conjunctivae normal.   Pulmonary:      Effort: Pulmonary effort is normal. No respiratory distress.   Musculoskeletal:         General: Normal range of motion.      Cervical back: Full passive range of motion without pain and normal range of motion.   Neurological:      Mental Status: He is alert and oriented to person, place, and time.   Psychiatric:         Mood and Affect: Mood is not anxious or depressed. Affect is not labile, blunt, angry or inappropriate.         Speech: Speech is not rapid and pressured or tangential.         Behavior: Behavior normal. Behavior is not agitated, slowed, aggressive, withdrawn, hyperactive or combative. Behavior is cooperative.         Thought Content: Thought content normal. Thought content is not paranoid or delusional. Thought  content does not include homicidal or suicidal ideation. Thought content does not include homicidal or suicidal plan.         Judgment: Judgment normal.       There were no vitals taken for this visit.  There is no height or weight on file to calculate BMI. Video appt unable to obtain.     Current Medications;;    Current Outpatient Medications:   •  clonazePAM (KlonoPIN) 0.5 MG tablet, Take 1 tablet by mouth 2 (Two) Times a Day As Needed for Anxiety., Disp: 15 tablet, Rfl: 2  •  sertraline (Zoloft) 50 MG tablet, Take 1 tablet by mouth Daily., Disp: 30 tablet, Rfl: 2    Lab Results:   No visits with results within 3 Month(s) from this visit.   Latest known visit with results is:   Lab on 01/13/2021   Component Date Value Ref Range Status   • Total Cholesterol 01/13/2021 352 (A) 0 - 200 mg/dL Final   • Triglycerides 01/13/2021 501 (A) 0 - 150 mg/dL Final   • HDL Cholesterol 01/13/2021 52  40 - 60 mg/dL Final   • LDL Cholesterol  01/13/2021 195 (A) 0 - 100 mg/dL Final   • VLDL Cholesterol 01/13/2021 105 (A) 5 - 40 mg/dL Final   • LDL/HDL Ratio 01/13/2021 3.84   Final   • TSH 01/13/2021 0.952  0.270 - 4.200 uIU/mL Final   • T Uptake 01/13/2021 0.97  0.80 - 1.30 TBI Final   • T4, Total 01/13/2021 6.86  4.50 - 11.70 mcg/dL Final    T4 results may be falsely increased if patient taking Biotin.   • Glucose 01/13/2021 87  65 - 99 mg/dL Final   • BUN 01/13/2021 7  6 - 20 mg/dL Final   • Creatinine 01/13/2021 0.76  0.76 - 1.27 mg/dL Final   • Sodium 01/13/2021 137  136 - 145 mmol/L Final   • Potassium 01/13/2021 4.2  3.5 - 5.2 mmol/L Final   • Chloride 01/13/2021 98  98 - 107 mmol/L Final   • CO2 01/13/2021 26.1  22.0 - 29.0 mmol/L Final   • Calcium 01/13/2021 9.9  8.6 - 10.5 mg/dL Final   • eGFR Non African Amer 01/13/2021 125  >60 mL/min/1.73 Final   • BUN/Creatinine Ratio 01/13/2021 9.2  7.0 - 25.0 Final   • Anion Gap 01/13/2021 12.9  5.0 - 15.0 mmol/L Final   • Magnesium 01/13/2021 2.1  1.6 - 2.6 mg/dL Final       Mental  Status Exam:   Hygiene:   good  Cooperation:  Cooperative  Eye Contact:  Good  Psychomotor Behavior:  Appropriate  Mood:anxious  Affect:  Appropriate  Hopelessness: Denies  Speech:  Normal  Thought Process:  Goal directed  Thought Content:  Normal  Suicidal:  None  Homicidal:  None  Hallucinations:  None  Delusion:  None  Memory:  Intact  Orientation:  Person, Place, Time and Situation  Reliability:  good  Insight:  Good  Judgement:  Good  Impulse Control:  Good  Physical/Medical Issues:  No     PHQ-9 Depression Screening  Little interest or pleasure in doing things?     Feeling down, depressed, or hopeless?     Trouble falling or staying asleep, or sleeping too much?     Feeling tired or having little energy?     Poor appetite or overeating?     Feeling bad about yourself - or that you are a failure or have let yourself or your family down?     Trouble concentrating on things, such as reading the newspaper or watching television?     Moving or speaking so slowly that other people could have noticed? Or the opposite - being so fidgety or restless that you have been moving around a lot more than usual?     Thoughts that you would be better off dead, or of hurting yourself in some way?     PHQ-9 Total Score     If you checked off any problems, how difficult have these problems made it for you to do your work, take care of things at home, or get along with other people?          Assessment/Plan:  Diagnoses and all orders for this visit:    1. MOSOE (generalized anxiety disorder) (Primary)  -     clonazePAM (KlonoPIN) 0.5 MG tablet; Take 1 tablet by mouth 2 (Two) Times a Day As Needed for Anxiety.  Dispense: 15 tablet; Refill: 2      Patient doing well with Zoloft and as needed Klonopin.  We will refill Klonopin however patient has refills of Zoloft.    A psychological evaluation was conducted in order to assess past and current level of functioning. Areas assessed included, but were not limited to: perception of social  support, perception of ability to face and deal with challenges in life (positive functioning), anxiety symptoms, depressive symptoms, perspective on beliefs/belief system, coping skills for stress, intelligence level,  Therapeutic rapport was established. Interventions conducted today were geared towards incorporating medication management along with support for continued therapy. Education was also provided as to the med management with this provider and what to expect in subsequent sessions.    We discussed risks, benefits,goals and side effects of the above medication and the patient was agreeable with the plan.Patient was educated on the importance of compliance with treatment and follow-up appointments. Patient is aware to contact the [facility] with any worsening of symptoms. To call for questions or concerns and return early if necessary. Patent is agreeable to go to the Emergency Department or call 911 should they begin SI/HI.     Part of this note may be an electronic transcription/translation of spoken language to printed text using the Dragon Dictation System.    Return in about 3 months (around 11/17/2022).    LOGAN Kim

## 2022-12-13 ENCOUNTER — TELEMEDICINE (OUTPATIENT)
Dept: PSYCHIATRY | Facility: CLINIC | Age: 27
End: 2022-12-13

## 2022-12-13 DIAGNOSIS — F41.1 GAD (GENERALIZED ANXIETY DISORDER): Primary | ICD-10-CM

## 2022-12-13 PROCEDURE — 99214 OFFICE O/P EST MOD 30 MIN: CPT | Performed by: NURSE PRACTITIONER

## 2022-12-13 RX ORDER — CLONAZEPAM 0.5 MG/1
0.5 TABLET ORAL 2 TIMES DAILY PRN
Qty: 15 TABLET | Refills: 2 | Status: SHIPPED | OUTPATIENT
Start: 2022-12-13 | End: 2023-03-13 | Stop reason: SDUPTHER

## 2022-12-13 NOTE — PROGRESS NOTES
Patient Name: Pilo Dobson  MRN: [id number]   :  1995     This provider is located at the [facility]), [address], [address] using a secure [employer] Video Visit through [employer]. Patient is being seen remotely via telehealth at their home address in [place], and stated they are in a secure environment for this session. The patient's condition being diagnosed/treated is appropriate for telemedicine. The provider identified herself as well as her credentials.   The patient, and/or patients guardian, consent to be seen remotely, and when consent is given they understand that the consent allows for patient identifiable information to be sent to a third party as needed.   They may refuse to be seen remotely at any time. The electronic data is encrypted and password protected, and the patient and/or guardian has been advised of the potential risks to privacy not withstanding such measures.    You have chosen to receive care through a telehealth visit.  Do you consent to use a video/audio connection for your medical care today? Yes    Chief Complaint:      ICD-10-CM ICD-9-CM   1. MOOSE (generalized anxiety disorder)  F41.1 300.02       History of Present Illness: Pilo Dobson is a 27 y.o. male is here today for medication management follow up.  Patient states he is doing well medications are still working.  Work is going well.  Has less stress.  Recently got engaged.  Is anxious about the holidays due to this and family's meeting.  Patient usually has seasonal affective depression however does not feel any worse this year which is a positive.  Is very consistent with the time of day he takes his medication.  Sleep is a lot better but still a little bit restless.    The following portions of the patient's history were reviewed and updated as appropriate: allergies, current medications, past family history, past medical history, past social  history, past surgical history and problem list.    Review of Systems;;  Review of Systems   Constitutional: Negative for activity change, appetite change, fatigue, unexpected weight gain and unexpected weight loss.   Respiratory: Negative for shortness of breath and wheezing.    Gastrointestinal: Negative for constipation, diarrhea, nausea and vomiting.   Musculoskeletal: Negative for gait problem.   Skin: Negative for dry skin and rash.   Neurological: Negative for dizziness, speech difficulty, weakness, light-headedness, headache, memory problem and confusion.   Psychiatric/Behavioral: Negative for agitation, behavioral problems, decreased concentration, dysphoric mood, hallucinations, self-injury, sleep disturbance, suicidal ideas, negative for hyperactivity, depressed mood and stress. The patient is nervous/anxious.        Physical Exam;;  Physical Exam  Constitutional:       General: He is not in acute distress.     Appearance: He is well-developed. He is not diaphoretic.   HENT:      Head: Normocephalic and atraumatic.   Eyes:      Conjunctiva/sclera: Conjunctivae normal.   Pulmonary:      Effort: Pulmonary effort is normal. No respiratory distress.   Musculoskeletal:         General: Normal range of motion.      Cervical back: Full passive range of motion without pain and normal range of motion.   Neurological:      Mental Status: He is alert and oriented to person, place, and time.   Psychiatric:         Mood and Affect: Mood is anxious. Mood is not depressed. Affect is not labile, blunt, angry or inappropriate.         Speech: Speech is not rapid and pressured or tangential.         Behavior: Behavior normal. Behavior is not agitated, slowed, aggressive, withdrawn, hyperactive or combative. Behavior is cooperative.         Thought Content: Thought content normal. Thought content is not paranoid or delusional. Thought content does not include homicidal or suicidal ideation. Thought content does not include  homicidal or suicidal plan.         Judgment: Judgment normal.       There were no vitals taken for this visit.  There is no height or weight on file to calculate BMI. Video appt unable to obtain.     Current Medications;;    Current Outpatient Medications:   •  clonazePAM (KlonoPIN) 0.5 MG tablet, Take 1 tablet by mouth 2 (Two) Times a Day As Needed for Anxiety., Disp: 15 tablet, Rfl: 2  •  sertraline (Zoloft) 50 MG tablet, Take 1 tablet by mouth Daily., Disp: 30 tablet, Rfl: 2    Lab Results:   No visits with results within 3 Month(s) from this visit.   Latest known visit with results is:   Lab on 01/13/2021   Component Date Value Ref Range Status   • Total Cholesterol 01/13/2021 352 (H)  0 - 200 mg/dL Final   • Triglycerides 01/13/2021 501 (H)  0 - 150 mg/dL Final   • HDL Cholesterol 01/13/2021 52  40 - 60 mg/dL Final   • LDL Cholesterol  01/13/2021 195 (H)  0 - 100 mg/dL Final   • VLDL Cholesterol 01/13/2021 105 (H)  5 - 40 mg/dL Final   • LDL/HDL Ratio 01/13/2021 3.84   Final   • TSH 01/13/2021 0.952  0.270 - 4.200 uIU/mL Final   • T Uptake 01/13/2021 0.97  0.80 - 1.30 TBI Final   • T4, Total 01/13/2021 6.86  4.50 - 11.70 mcg/dL Final    T4 results may be falsely increased if patient taking Biotin.   • Glucose 01/13/2021 87  65 - 99 mg/dL Final   • BUN 01/13/2021 7  6 - 20 mg/dL Final   • Creatinine 01/13/2021 0.76  0.76 - 1.27 mg/dL Final   • Sodium 01/13/2021 137  136 - 145 mmol/L Final   • Potassium 01/13/2021 4.2  3.5 - 5.2 mmol/L Final   • Chloride 01/13/2021 98  98 - 107 mmol/L Final   • CO2 01/13/2021 26.1  22.0 - 29.0 mmol/L Final   • Calcium 01/13/2021 9.9  8.6 - 10.5 mg/dL Final   • eGFR Non African Amer 01/13/2021 125  >60 mL/min/1.73 Final   • BUN/Creatinine Ratio 01/13/2021 9.2  7.0 - 25.0 Final   • Anion Gap 01/13/2021 12.9  5.0 - 15.0 mmol/L Final   • Magnesium 01/13/2021 2.1  1.6 - 2.6 mg/dL Final       Mental Status Exam:   Hygiene:   good  Cooperation:  Cooperative  Eye Contact:   Good  Psychomotor Behavior:  Appropriate  Mood:anxious  Affect:  Appropriate  Hopelessness: Denies  Speech:  Normal  Thought Process:  Goal directed  Thought Content:  Normal  Suicidal:  None  Homicidal:  None  Hallucinations:  None  Delusion:  None  Memory:  Intact  Orientation:  Person, Place, Time and Situation  Reliability:  good  Insight:  Good  Judgement:  Good  Impulse Control:  Good  Physical/Medical Issues:  No     PHQ-9 Depression Screening  Little interest or pleasure in doing things?     Feeling down, depressed, or hopeless?     Trouble falling or staying asleep, or sleeping too much?     Feeling tired or having little energy?     Poor appetite or overeating?     Feeling bad about yourself - or that you are a failure or have let yourself or your family down?     Trouble concentrating on things, such as reading the newspaper or watching television?     Moving or speaking so slowly that other people could have noticed? Or the opposite - being so fidgety or restless that you have been moving around a lot more than usual?     Thoughts that you would be better off dead, or of hurting yourself in some way?     PHQ-9 Total Score     If you checked off any problems, how difficult have these problems made it for you to do your work, take care of things at home, or get along with other people?        Reviewed [facility] # [id number]    Assessment/Plan:  Diagnoses and all orders for this visit:    1. MOOSE (generalized anxiety disorder) (Primary)  -     clonazePAM (KlonoPIN) 0.5 MG tablet; Take 1 tablet by mouth 2 (Two) Times a Day As Needed for Anxiety.  Dispense: 15 tablet; Refill: 2  -     sertraline (Zoloft) 50 MG tablet; Take 1 tablet by mouth Daily.  Dispense: 30 tablet; Refill: 2      Patient continues to do well with anxiety.  Patient taking Klonopin as needed only as instructed.  We will follow-up in 3 months.    A psychological evaluation was conducted in order to assess past and current level of functioning.  Areas assessed included, but were not limited to: perception of social support, perception of ability to face and deal with challenges in life (positive functioning), anxiety symptoms, depressive symptoms, perspective on beliefs/belief system, coping skills for stress, intelligence level,  Therapeutic rapport was established. Interventions conducted today were geared towards incorporating medication management along with support for continued therapy. Education was also provided as to the med management with this provider and what to expect in subsequent sessions.    We discussed risks, benefits,goals and side effects of the above medication and the patient was agreeable with the plan.Patient was educated on the importance of compliance with treatment and follow-up appointments. Patient is aware to contact the [facility] [phone] with any worsening of symptoms. To call for questions or concerns and return early if necessary. Patent is agreeable to go to the Emergency Department or call 911 should they begin SI/HI.     Part of this note may be an electronic transcription/translation of spoken language to printed text using the Dragon Dictation System.    Return in about 3 months (around 3/13/2023) for Follow Up 30 min, Video visit.    LOGAN Kim

## 2023-03-13 ENCOUNTER — TELEMEDICINE (OUTPATIENT)
Dept: PSYCHIATRY | Facility: CLINIC | Age: 28
End: 2023-03-13
Payer: COMMERCIAL

## 2023-03-13 DIAGNOSIS — F41.1 GAD (GENERALIZED ANXIETY DISORDER): Primary | ICD-10-CM

## 2023-03-13 PROCEDURE — 99214 OFFICE O/P EST MOD 30 MIN: CPT | Performed by: NURSE PRACTITIONER

## 2023-03-13 RX ORDER — CLONAZEPAM 0.5 MG/1
0.5 TABLET ORAL 2 TIMES DAILY PRN
Qty: 15 TABLET | Refills: 2 | Status: SHIPPED | OUTPATIENT
Start: 2023-03-13

## 2023-03-13 NOTE — PROGRESS NOTES
Patient Name: Pilo Dobson  MRN: [id number]   :  1995     This provider is located at her home office through the [facility] (through [facility]), [address] [place], [address] using a secure [employer] Video Visit through [employer]. Patient is being seen remotely via telehealth at their home address in [place], and stated they are in a secure environment for this session. The patient's condition being diagnosed/treated is appropriate for telemedicine. The provider identified herself as well as her credentials.   The patient, and/or patients guardian, consent to be seen remotely, and when consent is given they understand that the consent allows for patient identifiable information to be sent to a third party as needed.   They may refuse to be seen remotely at any time. The electronic data is encrypted and password protected, and the patient and/or guardian has been advised of the potential risks to privacy not withstanding such measures.    You have chosen to receive care through a telehealth visit.  Do you consent to use a video/audio connection for your medical care today? Yes    Chief Complaint:      ICD-10-CM ICD-9-CM   1. MOOSE (generalized anxiety disorder)  F41.1 300.02       History of Present Illness: Pilo Dobson is a 28 y.o. male is here today for medication management follow up.  Patient feels like Zoloft is helping however not as well as it did initially.  Would like to increase it to a whole tablet.  Has Klonopin as needed however tries not to take this very often.  States sleep is good.    The following portions of the patient's history were reviewed and updated as appropriate: allergies, current medications, past family history, past medical history, past social history, past surgical history and problem list.    Review of Systems;;  Review of Systems   Constitutional: Negative for activity change, appetite change, fatigue, unexpected weight gain  and unexpected weight loss.   Respiratory: Negative for shortness of breath and wheezing.    Gastrointestinal: Negative for constipation, diarrhea, nausea and vomiting.   Musculoskeletal: Negative for gait problem.   Skin: Negative for dry skin and rash.   Neurological: Negative for dizziness, speech difficulty, weakness, light-headedness, headache, memory problem and confusion.   Psychiatric/Behavioral: Negative for agitation, behavioral problems, decreased concentration, dysphoric mood, hallucinations, self-injury, sleep disturbance, suicidal ideas, negative for hyperactivity, depressed mood and stress. The patient is nervous/anxious.        Physical Exam;;  Physical Exam  Constitutional:       General: He is not in acute distress.     Appearance: He is well-developed. He is not diaphoretic.   HENT:      Head: Normocephalic and atraumatic.   Eyes:      Conjunctiva/sclera: Conjunctivae normal.   Pulmonary:      Effort: Pulmonary effort is normal. No respiratory distress.   Musculoskeletal:         General: Normal range of motion.      Cervical back: Full passive range of motion without pain and normal range of motion.   Neurological:      Mental Status: He is alert and oriented to person, place, and time.   Psychiatric:         Mood and Affect: Mood is anxious. Mood is not depressed. Affect is not labile, blunt, angry or inappropriate.         Speech: Speech is not rapid and pressured or tangential.         Behavior: Behavior normal. Behavior is not agitated, slowed, aggressive, withdrawn, hyperactive or combative. Behavior is cooperative.         Thought Content: Thought content normal. Thought content is not paranoid or delusional. Thought content does not include homicidal or suicidal ideation. Thought content does not include homicidal or suicidal plan.         Judgment: Judgment normal.       There were no vitals taken for this visit.  There is no height or weight on file to calculate BMI. Video appt unable to  obtain.     Current Medications;;    Current Outpatient Medications:   •  clonazePAM (KlonoPIN) 0.5 MG tablet, Take 1 tablet by mouth 2 (Two) Times a Day As Needed for Anxiety., Disp: 15 tablet, Rfl: 2  •  sertraline (Zoloft) 50 MG tablet, Take 1 tablet by mouth Daily., Disp: 30 tablet, Rfl: 2    Lab Results:   No visits with results within 3 Month(s) from this visit.   Latest known visit with results is:   Lab on 01/13/2021   Component Date Value Ref Range Status   • Total Cholesterol 01/13/2021 352 (H)  0 - 200 mg/dL Final   • Triglycerides 01/13/2021 501 (H)  0 - 150 mg/dL Final   • HDL Cholesterol 01/13/2021 52  40 - 60 mg/dL Final   • LDL Cholesterol  01/13/2021 195 (H)  0 - 100 mg/dL Final   • VLDL Cholesterol 01/13/2021 105 (H)  5 - 40 mg/dL Final   • LDL/HDL Ratio 01/13/2021 3.84   Final   • TSH 01/13/2021 0.952  0.270 - 4.200 uIU/mL Final   • T Uptake 01/13/2021 0.97  0.80 - 1.30 TBI Final   • T4, Total 01/13/2021 6.86  4.50 - 11.70 mcg/dL Final    T4 results may be falsely increased if patient taking Biotin.   • Glucose 01/13/2021 87  65 - 99 mg/dL Final   • BUN 01/13/2021 7  6 - 20 mg/dL Final   • Creatinine 01/13/2021 0.76  0.76 - 1.27 mg/dL Final   • Sodium 01/13/2021 137  136 - 145 mmol/L Final   • Potassium 01/13/2021 4.2  3.5 - 5.2 mmol/L Final   • Chloride 01/13/2021 98  98 - 107 mmol/L Final   • CO2 01/13/2021 26.1  22.0 - 29.0 mmol/L Final   • Calcium 01/13/2021 9.9  8.6 - 10.5 mg/dL Final   • eGFR Non African Amer 01/13/2021 125  >60 mL/min/1.73 Final   • BUN/Creatinine Ratio 01/13/2021 9.2  7.0 - 25.0 Final   • Anion Gap 01/13/2021 12.9  5.0 - 15.0 mmol/L Final   • Magnesium 01/13/2021 2.1  1.6 - 2.6 mg/dL Final       Mental Status Exam:   Hygiene:   good  Cooperation:  Cooperative  Eye Contact:  Good  Psychomotor Behavior:  Appropriate  Mood:anxious  Affect:  Appropriate  Hopelessness: Denies  Speech:  Normal  Thought Process:  Goal directed  Thought Content:  Normal  Suicidal:   None  Homicidal:  None  Hallucinations:  None  Delusion:  None  Memory:  Intact  Orientation:  Person, Place, Time and Situation  Reliability:  good  Insight:  Good  Judgement:  Good  Impulse Control:  Good    PHQ-9 Depression Screening  Little interest or pleasure in doing things? (P) 0-->not at all   Feeling down, depressed, or hopeless? (P) 1-->several days   Trouble falling or staying asleep, or sleeping too much? (P) 1-->several days   Feeling tired or having little energy? (P) 1-->several days   Poor appetite or overeating? (P) 1-->several days   Feeling bad about yourself - or that you are a failure or have let yourself or your family down? (P) 2-->more than half the days   Trouble concentrating on things, such as reading the newspaper or watching television? (P) 2-->more than half the days   Moving or speaking so slowly that other people could have noticed? Or the opposite - being so fidgety or restless that you have been moving around a lot more than usual? (P) 0-->not at all   Thoughts that you would be better off dead, or of hurting yourself in some way? (P) 0-->not at all   PHQ-9 Total Score (P) 8   If you checked off any problems, how difficult have these problems made it for you to do your work, take care of things at home, or get along with other people? (P) somewhat difficult        Assessment/Plan:  Diagnoses and all orders for this visit:    1. MOOSE (generalized anxiety disorder) (Primary)  -     clonazePAM (KlonoPIN) 0.5 MG tablet; Take 1 tablet by mouth 2 (Two) Times a Day As Needed for Anxiety.  Dispense: 15 tablet; Refill: 2  -     sertraline (Zoloft) 50 MG tablet; Take 1 tablet by mouth Daily.  Dispense: 30 tablet; Refill: 2      Patient feels some anxiety returning.  We will increase Zoloft to 50 mg daily.  Patient feels comfortable following up in 3 months.  Will call with any questions or concerns.    A psychological evaluation was conducted in order to assess past and current level of  functioning. Areas assessed included, but were not limited to: perception of social support, perception of ability to face and deal with challenges in life (positive functioning), anxiety symptoms, depressive symptoms, perspective on beliefs/belief system, coping skills for stress, intelligence level,  Therapeutic rapport was established. Interventions conducted today were geared towards incorporating medication management along with support for continued therapy. Education was also provided as to the med management with this provider and what to expect in subsequent sessions.    We discussed risks, benefits,goals and side effects of the above medication and the patient was agreeable with the plan.Patient was educated on the importance of compliance with treatment and follow-up appointments. Patient is aware to contact the [facility] [phone] with any worsening of symptoms. To call for questions or concerns and return early if necessary. Patent is agreeable to go to the Emergency Department or call 911 should they begin SI/HI.     Part of this note may be an electronic transcription/translation of spoken language to printed text using the Dragon Dictation System.    Return in about 3 months (around 6/13/2023) for Follow Up 30 min, Video visit.    LOGAN Kim

## 2024-02-01 DIAGNOSIS — F41.1 GAD (GENERALIZED ANXIETY DISORDER): ICD-10-CM

## 2024-02-01 RX ORDER — CLONAZEPAM 0.5 MG/1
0.5 TABLET ORAL 2 TIMES DAILY PRN
Qty: 15 TABLET | Refills: 2 | OUTPATIENT
Start: 2024-02-01